# Patient Record
Sex: MALE | Race: WHITE
[De-identification: names, ages, dates, MRNs, and addresses within clinical notes are randomized per-mention and may not be internally consistent; named-entity substitution may affect disease eponyms.]

---

## 2023-10-19 ENCOUNTER — HOSPITAL ENCOUNTER (EMERGENCY)
Dept: HOSPITAL 33 - ED | Age: 28
LOS: 1 days | Discharge: HOME | End: 2023-10-20
Payer: OTHER GOVERNMENT

## 2023-10-19 VITALS — TEMPERATURE: 98.5 F | RESPIRATION RATE: 18 BRPM | OXYGEN SATURATION: 96 %

## 2023-10-19 DIAGNOSIS — S93.401A: Primary | ICD-10-CM
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
## 2023-10-19 DIAGNOSIS — Z28.310: ICD-10-CM

## 2023-10-19 PROCEDURE — 99283 EMERGENCY DEPT VISIT LOW MDM: CPT

## 2023-10-19 PROCEDURE — 73610 X-RAY EXAM OF ANKLE: CPT

## 2023-10-20 VITALS — HEART RATE: 73 BPM | SYSTOLIC BLOOD PRESSURE: 109 MMHG | DIASTOLIC BLOOD PRESSURE: 86 MMHG

## 2023-10-20 NOTE — ERPHSYRPT
- History of Present Illness


Time Seen by Provider: 10/19/23 23:52


Source: patient


Exam Limitations: no limitations


Patient Subjective Stated Complaint: I was feeding the chickens and stepped over

a puddle into some mud and twisted my ankle, and I heard a pop.


Triage Nursing Assessment: pt ambulated into ER on crutches without difficulty. 

Pt c/o unable to bear weight onto rt ankle.  Pt states, "I was feeding the 

chickens tonight and stepped over a puddle into some mud and slipped, twisting 

my ankle and I heard a pop".  Rt ankle is swollen with some slight bruising 

noted.  Rt pedal pulse present.  Pt can wiggle his toes, do dorsiflexion and 

plantar flexion, but unable to move foot side to side.


Physician History: 





28-year-old male who presented in the ER with chief complaint of right lateral 

ankle pain and swelling after he twisted his ankle earlier today when he slipped

in mud.  Patient reported dull aching to sharp pain with flexion extension and 

he did report hearing a popping sound when it happened.  Gradually increasing 

swelling on the lateral ankle.  Pain is worse with weightbearing.  Using 

crutches which she had at home.  No injury anywhere else.





On exam has tenderness/swollen right lateral reproducible pain with patient and 

also with flexion extension.  Base of fifth metatarsal.  Offered pain medication

which she declined.  X-rays of negative for fracture dislocation reviewed by me,

official report is pending.  Placed in Aircast, weightbearing as tolerated, 

Tylenol ibuprofen and outpatient follow-up.


Allergies/Adverse Reactions: 








No Known Drug Allergies Allergy (Unverified 10/19/23 23:40)


   





Hx Tetanus, Diphtheria Vaccination/Date Given: Yes


Hx Influenza Vaccination/Date Given: Yes


Hx Pneumococcal Vaccination/Date Given: No


Immunizations Up to Date: Yes





Travel Risk





- International Travel


Have you traveled outside of the country in past 3 weeks: No





- Coronavirus Screening


Are you exhibiting any of the following symptoms?: No


Close contact with a COVID-19 positive Pt in past 14-21 Days: No





- Vaccine Status


Have you recieved a Covid-19 vaccination: No





- Review of Systems


Constitutional: No Symptoms


Ears, Nose, & Throat: No Symptoms


Respiratory: No Symptoms


Cardiac: No Symptoms


Abdominal/Gastrointestinal: No Symptoms


Musculoskeletal: Injury, Joint Pain, Joint Swelling


Skin: No Symptoms


Neurological: No Symptoms


Endocrine: No Symptoms





- Past Medical History


Pertinent Past Medical History: No





- Past Surgical History


Past Surgical History: No





- Social History


Smoking Status: Never smoker


Exposure to second hand smoke: No


Drug Use: none


Patient Lives Alone: No





- Nursing Vital Signs


Nursing Vital Signs: 





                               Initial Vital Signs











Temperature  98.5 F   10/19/23 23:29


 


Pulse Rate  110 H  10/19/23 23:29


 


Respiratory Rate  18   10/19/23 23:29


 


Blood Pressure  193/116   10/19/23 23:29


 


O2 Sat by Pulse Oximetry  96   10/19/23 23:29








                                   Pain Scale











Pain Intensity                 2

















- Physical Exam


General Appearance: no apparent distress


Neck Exam: normal inspection, full range of motion


Cardiovascular/Respiratory Exam: normal breath sounds, regular rate/rhythm


Back Exam: normal inspection, normal range of motion


Knees Exam: bilateral knee: non-tender, normal inspection, normal range of 

motion


Ankle Exam: right ankle: bone tenderness (Lateral malleolus), limited range of 

motion, pain, soft tissue tenderness, swelling, left ankle: non-tender, normal 

inspection, normal range of motion, no evidence of injury


Foot Exam: bilateral foot: non-tender, normal inspection, normal range of 

motion, no evidence of injury


Neuro/Tendon Exam: normal sensation, normal motor functions


Mental Status Exam: alert, oriented x 3, cooperative


Skin Exam: normal color


**SpO2 Interpretation**: normal


SpO2: 96


O2 Delivery: Room Air


Ordered Tests: 





                               Active Orders 24 hr











 Category Date Time Status


 


 ANKLE (3 VIEWS) Stat Exams  10/19/23 23:52 Taken














- Progress


Progress: unchanged


Progress Note: 





10/20/23 00:09


28-year-old male who presented in the ER with chief complaint of right lateral 

ankle pain and swelling after he twisted his ankle earlier today when he slipped

in mud.  Patient reported dull aching to sharp pain with flexion extension and 

he did report hearing a popping sound when it happened.  Gradually increasing 

swelling on the lateral ankle.  Pain is worse with weightbearing.  Using 

crutches which she had at home.  No injury anywhere else.





On exam has tenderness/swollen right lateral reproducible pain with patient and 

also with flexion extension.  Base of fifth metatarsal.  Offered pain medication

which she declined.  X-rays of negative for fracture dislocation reviewed by me,

official report is pending.  Placed in Aircast, weightbearing as tolerated, 

Tylenol ibuprofen and outpatient follow-up.


Counseled pt/family regarding: diagnosis, need for follow-up, rad results





Medical Desision Making





- Diagnostic Testing


Diagnostic test were ordered, analyzed, and reviewed by me: Yes


Radiological Interpretation: Interpreted by me, Reviewed by me





- Risk of complications


The pt has a mod risk of morbidity or mortality based on: Need for prescription 

drug management





- Departure


Departure Disposition: Home


Clinical Impression: 


 Ankle sprain





Condition: Stable


Critical Care Time: No


Referrals: 


[facility] - RAJI MURPHY NP [NON-STAFF PHY W/O PRIVILEGES] - Follow up/PCP as 

directed (1-2 days for reevaluation)


Instructions:  Ankle Sprain (DC)


Additional Instructions: 


Intermittent ice application.  Tylenol/ibuprofen as needed for pain.  

Weightbearing as tolerated.  Follow-up with primary plater/orthopedics for 

reevaluation in 1 to 2 days.  Return to ER for worsening.


Prescriptions: 


Ibuprofen 600 mg PO Q6HPRN PRN 10 Days #20 tablet


 PRN Reason: Pain

## 2023-10-20 NOTE — XRAY
Indication: Pain following fall.



Comparison: None



3 view right ankle demonstrates mild anterolateral soft tissue swelling and

small posterior calcaneal bone island.  No other bony, articular, or soft

tissue abnormalities.